# Patient Record
Sex: FEMALE | Race: BLACK OR AFRICAN AMERICAN | Employment: OTHER | URBAN - METROPOLITAN AREA
[De-identification: names, ages, dates, MRNs, and addresses within clinical notes are randomized per-mention and may not be internally consistent; named-entity substitution may affect disease eponyms.]

---

## 2017-01-04 ENCOUNTER — OFFICE VISIT (OUTPATIENT)
Dept: ONCOLOGY | Age: 72
End: 2017-01-04

## 2017-01-04 VITALS
HEART RATE: 84 BPM | OXYGEN SATURATION: 98 % | BODY MASS INDEX: 31.76 KG/M2 | WEIGHT: 186 LBS | RESPIRATION RATE: 18 BRPM | DIASTOLIC BLOOD PRESSURE: 72 MMHG | HEIGHT: 64 IN | TEMPERATURE: 98 F | SYSTOLIC BLOOD PRESSURE: 138 MMHG

## 2017-01-04 DIAGNOSIS — D75.839 THROMBOCYTOSIS: Primary | ICD-10-CM

## 2017-01-04 NOTE — PROGRESS NOTES
Mere Bolden is a 66 y.o. female here for follow up had concerns including Follow-up. ,mild fatigue, pain #8/10     Ms. Renay Pearson has a reminder for a \"due or due soon\" health maintenance. I have asked that she contact her primary care provider for follow-up on this health maintenance.

## 2017-07-10 ENCOUNTER — OFFICE VISIT (OUTPATIENT)
Dept: ONCOLOGY | Age: 72
End: 2017-07-10

## 2017-07-10 ENCOUNTER — HOSPITAL ENCOUNTER (OUTPATIENT)
Dept: LAB | Age: 72
Discharge: HOME | End: 2017-07-10
Payer: MEDICARE

## 2017-07-10 VITALS
WEIGHT: 183 LBS | SYSTOLIC BLOOD PRESSURE: 124 MMHG | BODY MASS INDEX: 31.24 KG/M2 | RESPIRATION RATE: 18 BRPM | OXYGEN SATURATION: 99 % | HEART RATE: 80 BPM | TEMPERATURE: 98.3 F | HEIGHT: 64 IN | DIASTOLIC BLOOD PRESSURE: 76 MMHG

## 2017-07-10 DIAGNOSIS — D75.839 THROMBOCYTOSIS: Primary | ICD-10-CM

## 2017-07-10 PROCEDURE — 36415 COLL VENOUS BLD VENIPUNCTURE: CPT

## 2017-07-10 PROCEDURE — 85025 COMPLETE CBC W/AUTO DIFF WBC: CPT

## 2017-07-10 NOTE — PROGRESS NOTES
Follow up Note        Patient: Radha Benitez MRN: [id number]  SSN: [id number]    YOB: 1945  Age: 66 y.o. Sex: female      Subjective:      Radha Benitez is a 66 y.o. female with mild thrombocytosis. She also has mild anemia. She is asymptomatic. Denies bleeding or infection. She is fatigued, but otherwise has no new complaints. She continues to work as a caregiver. She does not have any recent labs from her PCP. Review of Systems:    Constitutional: fatigue  Eyes: negative  Ears, Nose, Mouth, Throat, and Face: negative  Respiratory: negative  Cardiovascular: negative  Gastrointestinal: negative  Genitourinary:negative  Integument/Breast: negative  Hematologic/Lymphatic: negative  Musculoskeletal:negative  Neurological: negative      Past Medical History:   Diagnosis Date    Asthma     Diabetes ([address]     Hypertension      Past Surgical History:   Procedure Laterality Date    HX GYN      Partial Hysterectomy      Family History   Problem Relation Age of Onset    Cancer Mother     Diabetes Mother     Cancer Sister     Diabetes Sister     Psychiatric Disorder Sister     Cancer Brother      Social History   Substance Use Topics    Smoking status: Former Smoker    Smokeless tobacco: Not on file    Alcohol use Yes      Comment: occasional      Prior to Admission medications    Medication Sig Start Date End Date Taking? Authorizing Provider   [facility] 3.75 gram pwpk powder packet Take 3.75 g by mouth daily. 1/10/16  Yes Historical Provider   hydrochlorothiazide (HYDRODIURIL) 25 mg tablet Take 25 mg by mouth daily. 2/9/16  Yes Historical Provider   metFORMIN (GLUCOPHAGE) 500 mg tablet Take 500 mg by mouth daily (with breakfast). 12/4/15  Yes Historical Provider   NIFEDICAL XL 60 mg ER tablet Take 60 mg by mouth daily. 2/10/16  Yes Historical Provider   potassium chloride SA (MICRO-K) 10 mEq capsule Take 10 mEq by mouth two (2) times a day.  1/21/16  Yes Historical Provider   terbutaline (BRETHINE) 2.5 mg tablet Take 2.5 mg by mouth three (3) times daily. 2/11/16  Yes Historical Provider   zafirlukast (ACCOLATE) 20 mg tablet Take 20 mg by mouth two (2) times a day. 2/15/16  Yes Historical Provider   lansoprazole (PREVACID) 30 mg capsule Take  by mouth Daily (before breakfast). Yes Historical Provider   fluticasone (VERAMYST) 27.5 mcg/actuation nasal spray 2 Sprays by Nasal route daily. Yes Historical Provider   PROAIR HFA 90 mcg/actuation inhaler  2/1/16   Historical Provider   predniSONE (DELTASONE) 5 mg tablet  2/16/16   Historical Provider   tobramycin (TOBREX) 0.3 % ophthalmic solution Administer 1 Drop to both eyes. 2/1/16   Historical Provider            Allergies   Allergen Reactions    Aspirin Nausea Only    Codeine Other (comments)     Jittery    Lodine [Etodolac] Other (comments)     Nausea, jittery           Objective:     Vitals:    07/10/17 1021   BP: 124/76   Pulse: 80   Resp: 18   Temp: 98.3 °F (36.8 °C)   TempSrc: Oral   SpO2: 99%   Weight: 183 lb (83 kg)   Height: 5' 4\" (1.626 m)          Physical Exam:    GENERAL: alert, cooperative  EYE: negative  LYMPHATIC: Cervical, supraclavicular, and axillary nodes normal.   THROAT & NECK: normal and no erythema or exudates noted. LUNG: clear to auscultation bilaterally  HEART: regular rate and rhythm  ABDOMEN: soft, non-tender  EXTREMITIES: no cyanosis or edema  SKIN: Normal.  NEUROLOGIC: negative        LABS:     Lab Results   Component Value Date/Time    WBC 6.5 07/22/2016 09:16 AM    HGB 11.7 07/22/2016 09:16 AM    HCT 36.8 07/22/2016 09:16 AM    PLATELET 787 00/57/2478 09:16 AM    MCV 78 07/22/2016 09:16 AM             Assessment:     1. Mild thrombocytosis    ? Essential thrombocytosis  If the platelet continue to rise, I will obtain JESSICA 2 mutation        Plan:       1. Repeat CBC today and continue observation  2.  Follow up in 6 months        Signed by: Usman Pfeiffer MD                     July 10, 2017

## 2017-07-10 NOTE — PROGRESS NOTES
Chief Complaint   Patient presents with    Follow-up     increased platelets; just had labs done this am     Appetite normal. Urrinating/bowels normal.

## 2017-07-11 ENCOUNTER — DOCUMENTATION ONLY (OUTPATIENT)
Dept: ONCOLOGY | Age: 72
End: 2017-07-11

## 2017-07-11 LAB
BASOPHILS # BLD AUTO: 0 X10E3/UL (ref 0–0.2)
BASOPHILS NFR BLD AUTO: 0 %
EOSINOPHIL # BLD AUTO: 0.5 X10E3/UL (ref 0–0.4)
EOSINOPHIL NFR BLD AUTO: 6 %
ERYTHROCYTE [DISTWIDTH] IN BLOOD BY AUTOMATED COUNT: 15.6 % (ref 12.3–15.4)
HCT VFR BLD AUTO: 33.9 % (ref 34–46.6)
HGB BLD-MCNC: 11.1 G/DL (ref 11.1–15.9)
IMM GRANULOCYTES # BLD: 0 X10E3/UL (ref 0–0.1)
IMM GRANULOCYTES NFR BLD: 0 %
LYMPHOCYTES # BLD AUTO: 2.4 X10E3/UL (ref 0.7–3.1)
LYMPHOCYTES NFR BLD AUTO: 34 %
MCH RBC QN AUTO: 25.6 PG (ref 26.6–33)
MCHC RBC AUTO-ENTMCNC: 32.7 G/DL (ref 31.5–35.7)
MCV RBC AUTO: 78 FL (ref 79–97)
MONOCYTES # BLD AUTO: 0.3 X10E3/UL (ref 0.1–0.9)
MONOCYTES NFR BLD AUTO: 4 %
NEUTROPHILS # BLD AUTO: 4 X10E3/UL (ref 1.4–7)
NEUTROPHILS NFR BLD AUTO: 56 %
PLATELET # BLD AUTO: 437 X10E3/UL (ref 150–379)
RBC # BLD AUTO: 4.34 X10E6/UL (ref 3.77–5.28)
WBC # BLD AUTO: 7.2 X10E3/UL (ref 3.4–10.8)

## 2017-07-11 NOTE — PROGRESS NOTES
Patient made aware of lab results, and platelets are stable. HIPAA verified by two patient identifiers.

## 2017-09-22 ENCOUNTER — HOSPITAL ENCOUNTER (OUTPATIENT)
Dept: GENERAL RADIOLOGY | Age: 72
Discharge: HOME | End: 2017-09-22
Payer: MEDICARE

## 2017-09-22 ENCOUNTER — HOSPITAL ENCOUNTER (OUTPATIENT)
Dept: MAMMOGRAPHY | Age: 72
Discharge: HOME | End: 2017-09-22
Payer: MEDICARE

## 2017-09-22 DIAGNOSIS — Z12.31 VISIT FOR SCREENING MAMMOGRAM: ICD-10-CM

## 2017-09-22 DIAGNOSIS — M79.641 RIGHT HAND PAIN: ICD-10-CM

## 2017-09-22 PROCEDURE — G0202 SCR MAMMO BI INCL CAD: HCPCS

## 2017-09-22 PROCEDURE — 73130 X-RAY EXAM OF HAND: CPT

## 2018-01-10 ENCOUNTER — OFFICE VISIT (OUTPATIENT)
Dept: ONCOLOGY | Age: 73
End: 2018-01-10

## 2018-01-10 ENCOUNTER — HOSPITAL ENCOUNTER (OUTPATIENT)
Dept: LAB | Age: 73
Discharge: HOME | End: 2018-01-10
Payer: MEDICARE

## 2018-01-10 VITALS
WEIGHT: 185.6 LBS | RESPIRATION RATE: 16 BRPM | SYSTOLIC BLOOD PRESSURE: 115 MMHG | BODY MASS INDEX: 31.69 KG/M2 | HEIGHT: 64 IN | OXYGEN SATURATION: 96 % | TEMPERATURE: 98.1 F | HEART RATE: 80 BPM | DIASTOLIC BLOOD PRESSURE: 74 MMHG

## 2018-01-10 DIAGNOSIS — D75.839 THROMBOCYTOSIS: ICD-10-CM

## 2018-01-10 DIAGNOSIS — D75.839 THROMBOCYTOSIS: Primary | ICD-10-CM

## 2018-01-10 PROCEDURE — 82728 ASSAY OF FERRITIN: CPT

## 2018-01-10 PROCEDURE — 81270 JAK2 GENE: CPT

## 2018-01-10 PROCEDURE — 36415 COLL VENOUS BLD VENIPUNCTURE: CPT

## 2018-01-10 PROCEDURE — 81402 MOPATH PROCEDURE LEVEL 3: CPT

## 2018-01-10 PROCEDURE — 81206 BCR/ABL1 GENE MAJOR BP: CPT

## 2018-01-10 PROCEDURE — 85025 COMPLETE CBC W/AUTO DIFF WBC: CPT

## 2018-01-10 RX ORDER — METHYLPREDNISOLONE 4 MG/1
TABLET ORAL
Refills: 0 | COMMUNITY
Start: 2017-12-10 | End: 2018-01-10 | Stop reason: ALTCHOICE

## 2018-01-10 RX ORDER — NEOMYCIN SULFATE, POLYMYXIN B SULFATE AND HYDROCORTISONE 10; 3.5; 1 MG/ML; MG/ML; [USP'U]/ML
SUSPENSION/ DROPS AURICULAR (OTIC)
Refills: 0 | COMMUNITY
Start: 2017-10-24 | End: 2019-07-17 | Stop reason: ALTCHOICE

## 2018-01-10 RX ORDER — LIDOCAINE 50 MG/G
PATCH TOPICAL
Refills: 0 | COMMUNITY
Start: 2017-10-26

## 2018-01-10 RX ORDER — MECLIZINE HYDROCHLORIDE 25 MG/1
TABLET ORAL
Refills: 0 | COMMUNITY
Start: 2017-10-24

## 2018-01-10 NOTE — PROGRESS NOTES
Follow up Note        Patient: Geneva iGfford MRN: [id number]  SSN: [id number]    YOB: 1945  Age: 74 y.o. Sex: female      Subjective:      Geneva Gifford is a 74 y.o. female with mild thrombocytosis. She also has mild anemia. She is asymptomatic. Denies bleeding or infection. She is fatigued, but otherwise has no new complaints. She continues to work as a caregiver. Review of Systems:    Constitutional: fatigue  Eyes: negative  Ears, Nose, Mouth, Throat, and Face: negative  Respiratory: negative  Cardiovascular: negative  Gastrointestinal: negative  Genitourinary:negative  Integument/Breast: negative  Hematologic/Lymphatic: negative  Musculoskeletal:negative  Neurological: negative      Past Medical History:   Diagnosis Date    Asthma     [address] [address] [facility]     Hypertension     Menopause 1978     Past Surgical History:   Procedure Laterality Date    HX GYN      Partial Hysterectomy    HX HYSTERECTOMY  1978      Family History   Problem Relation Age of Onset    Cancer Mother     Diabetes Mother     Cancer Sister     Diabetes Sister     Psychiatric Disorder Sister     Cancer Brother     Breast Cancer Maternal Aunt      Social History   Substance Use Topics    Smoking status: Former Smoker    Smokeless tobacco: Never Used    Alcohol use Yes      Comment: occasional      Prior to Admission medications    Medication Sig Start Date End Date Taking? Authorizing Provider   neomycin-polymyxin-hydrocortisone, buffered, (PEDIOTIC) 3.5-10,000-1 mg/mL-unit/mL-% otic suspension instill 4 drops into affected ear three times a day for 10 days 10/24/17  Yes Historical Provider   PROAIR HFA 90 mcg/actuation inhaler  2/1/16  Yes Historical Provider   WELCHOL 3.75 gram pwpk powder packet Take 3.75 g by mouth daily. 1/10/16  Yes Historical Provider   hydrochlorothiazide (HYDRODIURIL) 25 mg tablet Take 25 mg by mouth daily.  2/9/16  Yes Historical Provider   metFORMIN (GLUCOPHAGE) 500 mg tablet Take 500 mg by mouth daily (with breakfast). 12/4/15  Yes Historical Provider   NIFEDICAL XL 60 mg ER tablet Take 60 mg by mouth daily. 2/10/16  Yes Historical Provider   potassium chloride SA (MICRO-K) 10 mEq capsule Take 10 mEq by mouth two (2) times a day. 1/21/16  Yes Historical Provider   terbutaline (BRETHINE) 2.5 mg tablet Take 2.5 mg by mouth three (3) times daily. 2/11/16  Yes Historical Provider   zafirlukast (ACCOLATE) 20 mg tablet Take 20 mg by mouth two (2) times a day. 2/15/16  Yes Historical Provider   lansoprazole (PREVACID) 30 mg capsule Take  by mouth Daily (before breakfast). Yes Historical Provider   fluticasone (VERAMYST) 27.5 mcg/actuation nasal spray 2 Sprays by Nasal route daily. Yes Historical Provider   lidocaine (LIDODERM) 5 % apply patch to affected area once daily if needed for pain -ON FOR 12 HOURS OFF FOR 12 HOURS 10/26/17   Historical Provider   meclizine (ANTIVERT) 25 mg tablet take 1 tablet by mouth twice a day if needed 10/24/17   Historical Provider   predniSONE (DELTASONE) 5 mg tablet  2/16/16   Historical Provider   tobramycin (TOBREX) 0.3 % ophthalmic solution Administer 1 Drop to both eyes. 2/1/16   Historical Provider            Allergies   Allergen Reactions    Aspirin Nausea Only    Codeine Other (comments)     Jittery    Lodine [Etodolac] Other (comments)     Nausea, jittery           Objective:     Vitals:    01/10/18 1039   BP: 115/74   Pulse: 80   Resp: 16   Temp: 98.1 °F (36.7 °C)   TempSrc: Oral   SpO2: 96%   Weight: 185 lb 9.6 oz (84.2 kg)   Height: 5' 4\" (1.626 m)          Physical Exam:    GENERAL: alert, cooperative  EYE: negative  LYMPHATIC: Cervical, supraclavicular, and axillary nodes normal.   THROAT & NECK: normal and no erythema or exudates noted.    LUNG: clear to auscultation bilaterally  HEART: regular rate and rhythm  ABDOMEN: soft, non-tender  EXTREMITIES: no cyanosis or edema  SKIN: Normal.  NEUROLOGIC: negative        LABS:     Lab Results Component Value Date/Time    WBC 7.2 07/10/2017 11:38 AM    HGB 11.1 07/10/2017 11:38 AM    HCT 33.9 07/10/2017 11:38 AM    PLATELET 612 82/97/4986 11:38 AM    MCV 78 07/10/2017 11:38 AM             Assessment:     1. Mild thrombocytosis    ? Essential thrombocytosis  Platelets stable    Recheck today    Symptom management form reviewed with patient.       Plan:       > Labs today: CBC, JAK2, MPL,BCR-ABL1, Ferritin  > Follow-up in 6 months      Signed by: Ngozi Loya MD                     January 10, 2018

## 2018-01-10 NOTE — PROGRESS NOTES
Cris Sunshine is a 74 y.o. female here today for Thrombocytosis f/u. Observation. VS stable. Patient states she has pain 4/10 to lower back d/t spinal stenosis and pain to bilat knees d/t arthritis. Patient denies active bleeding. Blood pressure 115/74, pulse 80, temperature 98.1 °F (36.7 °C), temperature source Oral, resp. rate 16, height 5' 4\" (1.626 m), weight 185 lb 9.6 oz (84.2 kg), SpO2 96 %.

## 2018-01-17 LAB
BACKGROUND, 081113: NORMAL
BACKGROUND: 480503: NORMAL
BASOPHILS # BLD AUTO: 0 X10E3/UL (ref 0–0.2)
BASOPHILS NFR BLD AUTO: 1 %
EOSINOPHIL # BLD AUTO: 0.3 X10E3/UL (ref 0–0.4)
EOSINOPHIL NFR BLD AUTO: 4 %
ERYTHROCYTE [DISTWIDTH] IN BLOOD BY AUTOMATED COUNT: 15.8 % (ref 12.3–15.4)
FERRITIN SERPL-MCNC: 107 NG/ML (ref 15–150)
HCT VFR BLD AUTO: 35.8 % (ref 34–46.6)
HGB BLD-MCNC: 12.1 G/DL (ref 11.1–15.9)
IMM GRANULOCYTES # BLD: 0 X10E3/UL (ref 0–0.1)
IMM GRANULOCYTES NFR BLD: 0 %
INTERPRETATION: 480488: NORMAL
JAK2 P.V617F BLD/T QL: NORMAL
LAB DIRECTOR NAME PROVIDER: NORMAL
LYMPHOCYTES # BLD AUTO: 2.4 X10E3/UL (ref 0.7–3.1)
LYMPHOCYTES NFR BLD AUTO: 34 %
MCH RBC QN AUTO: 25.7 PG (ref 26.6–33)
MCHC RBC AUTO-ENTMCNC: 33.8 G/DL (ref 31.5–35.7)
MCV RBC AUTO: 76 FL (ref 79–97)
MONOCYTES # BLD AUTO: 0.4 X10E3/UL (ref 0.1–0.9)
MONOCYTES NFR BLD AUTO: 6 %
MPL GENE MUT TESTED BLD/T: NORMAL
MPL P.W515L+W515K+S505N BLD/T QL: NORMAL
NEUTROPHILS # BLD AUTO: 4 X10E3/UL (ref 1.4–7)
NEUTROPHILS NFR BLD AUTO: 55 %
PLATELET # BLD AUTO: 522 X10E3/UL (ref 150–379)
RBC # BLD AUTO: 4.71 X10E6/UL (ref 3.77–5.28)
REF LAB TEST METHOD: NORMAL
REFERENCES, 150293: NORMAL
SERVICE CMNT-IMP: NORMAL
T(ABL1,BCR)B2A2/CONTROL BLD/T: NORMAL %
T(ABL1,BCR)B3A2/CONTROL BLD/T: NORMAL %
T(ABL1,BCR)E1A2/CONTROL BLD/T: NORMAL %
WBC # BLD AUTO: 7 X10E3/UL (ref 3.4–10.8)

## 2018-07-23 ENCOUNTER — OFFICE VISIT (OUTPATIENT)
Dept: ONCOLOGY | Age: 73
End: 2018-07-23

## 2018-07-23 ENCOUNTER — HOSPITAL ENCOUNTER (OUTPATIENT)
Dept: LAB | Age: 73
Discharge: HOME | End: 2018-07-23
Payer: MEDICARE

## 2018-07-23 VITALS
RESPIRATION RATE: 16 BRPM | BODY MASS INDEX: 32.71 KG/M2 | HEIGHT: 64 IN | SYSTOLIC BLOOD PRESSURE: 126 MMHG | TEMPERATURE: 98.5 F | WEIGHT: 191.6 LBS | HEART RATE: 75 BPM | DIASTOLIC BLOOD PRESSURE: 75 MMHG | OXYGEN SATURATION: 97 %

## 2018-07-23 DIAGNOSIS — D75.839 THROMBOCYTOSIS: Primary | ICD-10-CM

## 2018-07-23 PROCEDURE — 36415 COLL VENOUS BLD VENIPUNCTURE: CPT

## 2018-07-23 PROCEDURE — 85027 COMPLETE CBC AUTOMATED: CPT

## 2018-07-23 NOTE — PROGRESS NOTES
Nupur Sweeney is a 74 y.o. female here today for Thrombocytosis f/u. 1/10/18 labs Ferritin 107, JAK2; negative, No MPL mutation. WBC, 7.0, Hgb 12.1, HCT 35.8, Plts 522. VS stable. Patient states she has back d/t spinal stenosis and b/l knee pain; pain 7/10; no relief with Ibuprofen. Good appetite. Patient states she has numbness and tingling in her fingers at times. Patient denies active bleeding. 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 \"    Visit Vitals    /75 (BP 1 Location: Left arm, BP Patient Position: Sitting)    Pulse 75    Temp 98.5 °F (36.9 °C) (Oral)    Resp 16    Ht 5' 4\" (1.626 m)    Wt 191 lb 9.6 oz (86.9 kg)    SpO2 97%    BMI 32.89 kg/m2     Health Maintenance Review: Patient reminded of \"due or due soon\" health maintenance. I have asked the patient to contact his/her primary care provider (PCP) for follow-up on his/her health maintenance.

## 2018-07-23 NOTE — PROGRESS NOTES
Follow up Note        Patient: Jose L Arriaga MRN: [id number]  SSN: [id number]    YOB: 1945  Age: 74 y.o. Sex: female      Subjective:      Jose L Arriaga is a 74 y.o. female with thrombocytosis. She is asymptomatic. Denies bleeding or infection. Review of Systems:    Constitutional: fatigue  Eyes: negative  Ears, Nose, Mouth, Throat, and Face: negative  Respiratory: negative  Cardiovascular: negative  Gastrointestinal: negative  Genitourinary:negative  Integument/Breast: negative  Hematologic/Lymphatic: negative  Musculoskeletal:back and knee pain  Neurological: negative      Past Medical History:   Diagnosis Date    Asthma     [address] [address]     Hypertension     Menopause 1978     Past Surgical History:   Procedure Laterality Date    HX GYN      Partial Hysterectomy    HX HYSTERECTOMY  1978      Family History   Problem Relation Age of Onset   [address] [place] [address] Cancer Mother     Diabetes Mother     Cancer Sister     Diabetes Sister     Psychiatric Disorder Sister     Cancer Brother     Breast Cancer Maternal Aunt      Social History   Substance Use Topics    Smoking status: Former Smoker    Smokeless tobacco: Never Used    Alcohol use Yes      Comment: occasional      Prior to Admission medications    Medication Sig Start Date End Date Taking? Authorizing Provider   lidocaine (LIDODERM) 5 % apply patch to affected area once daily if needed for pain -ON FOR 12 HOURS OFF FOR 12 HOURS 10/26/17  Yes Historical Provider   meclizine (ANTIVERT) 25 mg tablet take 1 tablet by mouth twice a day if needed 10/24/17  Yes Historical Provider   neomycin-polymyxin-hydrocortisone, buffered, (PEDIOTIC) 3.5-10,000-1 mg/mL-unit/mL-% otic suspension instill 4 drops into affected ear three times a day for 10 days 10/24/17  Yes Historical Provider   PROAIR HFA 90 mcg/actuation inhaler  2/1/16  Yes Historical Provider   WELCHOL 3.75 gram pwpk powder packet Take 3.75 g by mouth daily.  1/10/16  Yes Historical Provider   hydrochlorothiazide (HYDRODIURIL) 25 mg tablet Take 25 mg by mouth daily. 2/9/16  Yes Historical Provider   metFORMIN (GLUCOPHAGE) 500 mg tablet Take 500 mg by mouth daily (with breakfast). 12/4/15  Yes Historical Provider   NIFEDICAL XL 60 mg ER tablet Take 60 mg by mouth daily. 2/10/16  Yes Historical Provider   potassium chloride SA (MICRO-K) 10 mEq capsule Take 10 mEq by mouth two (2) times a day. 1/21/16  Yes Historical Provider   terbutaline (BRETHINE) 2.5 mg tablet Take 2.5 mg by mouth three (3) times daily. 2/11/16  Yes Historical Provider   zafirlukast (ACCOLATE) 20 mg tablet Take 20 mg by mouth two (2) times a day. 2/15/16  Yes Historical Provider   lansoprazole (PREVACID) 30 mg capsule Take  by mouth Daily (before breakfast). Yes Historical Provider   predniSONE (DELTASONE) 5 mg tablet  2/16/16   Historical Provider   tobramycin (TOBREX) 0.3 % ophthalmic solution Administer 1 Drop to both eyes. 2/1/16   Historical Provider   fluticasone (VERAMYST) 27.5 mcg/actuation nasal spray 2 Sprays by Nasal route daily. Historical Provider            Allergies   Allergen Reactions    Aspirin Nausea Only    Codeine Other (comments)     Jittery    Lodine [Etodolac] Other (comments)     Nausea, jittery           Objective:     Vitals:    07/23/18 1035   BP: 126/75   Pulse: 75   Resp: 16   Temp: 98.5 °F (36.9 °C)   TempSrc: Oral   SpO2: 97%   Weight: 191 lb 9.6 oz (86.9 kg)   Height: 5' 4\" (1.626 m)          Physical Exam:    GENERAL: alert, cooperative  EYE: negative  LYMPHATIC: Cervical, supraclavicular, and axillary nodes normal.   THROAT & NECK: normal and no erythema or exudates noted.    LUNG: clear to auscultation bilaterally  HEART: regular rate and rhythm  ABDOMEN: soft, non-tender  EXTREMITIES: no cyanosis or edema  SKIN: Normal.  NEUROLOGIC: negative        LABS: 1/10/2018    Lab Results   Component Value Date/Time    WBC 7.0 01/10/2018 11:34 AM    HGB 12.1 01/10/2018 11:34 AM    HCT 35.8 01/10/2018 11:34 AM    PLATELET 236 (H) 31/12/8180 11:34 AM    MCV 76 (L) 01/10/2018 11:34 AM         Assessment:     1. Mild thrombocytosis    ? Essential thrombocytosis  Platelets stable    JAK2, BCR-ABL1, MPL mutation - negative  Asymptomatic  Symptom management form reviewed with patient.       Plan:       > continue observation   > CBC without diff today  > Follow-up in 6 months        Signed by: Louise Gauthier MD                     July 24, 2018

## 2018-07-24 LAB
ERYTHROCYTE [DISTWIDTH] IN BLOOD BY AUTOMATED COUNT: 15.3 % (ref 12.3–15.4)
HCT VFR BLD AUTO: 32.4 % (ref 34–46.6)
HGB BLD-MCNC: 10.9 G/DL (ref 11.1–15.9)
MCH RBC QN AUTO: 26.1 PG (ref 26.6–33)
MCHC RBC AUTO-ENTMCNC: 33.6 G/DL (ref 31.5–35.7)
MCV RBC AUTO: 78 FL (ref 79–97)
PLATELET # BLD AUTO: 498 X10E3/UL (ref 150–379)
RBC # BLD AUTO: 4.17 X10E6/UL (ref 3.77–5.28)
WBC # BLD AUTO: 9.6 X10E3/UL (ref 3.4–10.8)

## 2019-01-25 ENCOUNTER — HOSPITAL ENCOUNTER (OUTPATIENT)
Dept: LAB | Age: 74
Discharge: HOME | End: 2019-01-25
Payer: MEDICARE

## 2019-01-25 ENCOUNTER — OFFICE VISIT (OUTPATIENT)
Dept: ONCOLOGY | Age: 74
End: 2019-01-25

## 2019-01-25 VITALS
SYSTOLIC BLOOD PRESSURE: 137 MMHG | RESPIRATION RATE: 18 BRPM | WEIGHT: 186 LBS | OXYGEN SATURATION: 97 % | DIASTOLIC BLOOD PRESSURE: 80 MMHG | BODY MASS INDEX: 31.76 KG/M2 | HEIGHT: 64 IN | HEART RATE: 80 BPM | TEMPERATURE: 98.5 F

## 2019-01-25 DIAGNOSIS — D75.839 THROMBOCYTOSIS: Primary | ICD-10-CM

## 2019-01-25 PROCEDURE — 36415 COLL VENOUS BLD VENIPUNCTURE: CPT

## 2019-01-25 PROCEDURE — 85025 COMPLETE CBC W/AUTO DIFF WBC: CPT

## 2019-01-25 NOTE — PROGRESS NOTES
[facility] [place] 
at [facility], [facility], [address] 
[phone] Follow up Note Patient: Radha Benitez MRN: [id number]  SSN: [id number] YOB: 1945  Age: 74 y.o. Sex: female Subjective:  
  
Radha Benitez is a 74 y.o. female with thrombocytosis. She is asymptomatic. Denies bleeding or infection. Review of Systems: 
 
Constitutional: fatigue Eyes: negative Ears, Nose, Mouth, Throat, and Face: negative Respiratory: negative Cardiovascular: negative Gastrointestinal: negative Genitourinary:negative Integument/Breast: negative Hematologic/Lymphatic: negative Musculoskeletal:back and knee pain Neurological: negative Past Medical History:  
Diagnosis Date  Asthma  Diabetes ([address]  Hypertension [address] [facility] Past Surgical History:  
Procedure Laterality Date  [facility] Family History Problem Relation Age of Onset  Cancer Mother  Diabetes Mother  Cancer Sister  Diabetes Sister  Psychiatric Disorder Sister  Cancer Brother  Breast Cancer Maternal Aunt Social History Tobacco Use  Smoking status: Former Smoker  Smokeless tobacco: Never Used Substance Use Topics  Alcohol use: Yes Comment: occasional  
  
Prior to Admission medications Medication Sig Start Date End Date Taking? Authorizing Provider PROAIR HFA 90 mcg/actuation inhaler  2/1/16  Yes Provider, Historical  
hydrochlorothiazide (HYDRODIURIL) 25 mg tablet Take 25 mg by mouth daily. 2/9/16  Yes Provider, Historical  
metFORMIN (GLUCOPHAGE) 500 mg tablet Take 500 mg by mouth daily (with breakfast). 12/4/15  Yes Provider, Historical  
NIFEDICAL XL 60 mg ER tablet Take 60 mg by mouth daily.  2/10/16  Yes Provider, Historical  
 potassium chloride SA (MICRO-K) 10 mEq capsule Take 10 mEq by mouth two (2) times a day. 1/21/16  Yes Provider, Historical  
terbutaline (BRETHINE) 2.5 mg tablet Take 2.5 mg by mouth three (3) times daily. 2/11/16  Yes Provider, Historical  
zafirlukast (ACCOLATE) 20 mg tablet Take 20 mg by mouth two (2) times a day. 2/15/16  Yes Provider, Historical  
lansoprazole (PREVACID) 30 mg capsule Take  by mouth Daily (before breakfast). Yes Provider, Historical  
fluticasone (VERAMYST) 27.5 mcg/actuation nasal spray 2 Sprays by Nasal route daily. Yes Provider, Historical  
lidocaine (LIDODERM) 5 % apply patch to affected area once daily if needed for pain -ON FOR 12 HOURS OFF FOR 12 HOURS 10/26/17   Provider, Historical  
meclizine (ANTIVERT) 25 mg tablet take 1 tablet by mouth twice a day if needed 10/24/17   Provider, Historical  
neomycin-polymyxin-hydrocortisone, buffered, (PEDIOTIC) 3.5-10,000-1 mg/mL-unit/mL-% otic suspension instill 4 drops into affected ear three times a day for 10 days 10/24/17   Provider, Historical  
WELCHOL 3.75 gram pwpk powder packet Take 3.75 g by mouth daily. 1/10/16   Provider, Historical  
predniSONE (DELTASONE) 5 mg tablet  2/16/16   Provider, Historical  
tobramycin (TOBREX) 0.3 % ophthalmic solution Administer 1 Drop to both eyes. 2/1/16   Provider, Historical  
  
 
 
 
Allergies Allergen Reactions  Aspirin Nausea Only  Codeine Other (comments) Jittery  Lodine [Etodolac] Other (comments) Nausea, jittery Objective:  
 
Vitals:  
 01/25/19 1042 BP: 137/80 Pulse: 80 Resp: 18 Temp: 98.5 °F (36.9 °C) TempSrc: Oral  
SpO2: 97% Weight: 186 lb (84.4 kg) Height: 5' 4\" (1.626 m) Physical Exam: 
 
GENERAL: alert, cooperative EYE: negative LYMPHATIC: Cervical, supraclavicular, and axillary nodes normal.  
THROAT & NECK: normal and no erythema or exudates noted. LUNG: clear to auscultation bilaterally HEART: regular rate and rhythm ABDOMEN: soft, non-tender EXTREMITIES: no cyanosis or edema SKIN: Normal. 
NEUROLOGIC: negative LABS: 1/10/2018 Lab Results Component Value Date/Time WBC 6.6 01/25/2019 12:00 PM  
 HGB 11.3 01/25/2019 12:00 PM  
 HCT 35.9 01/25/2019 12:00 PM  
 PLATELET 978 (H) 27/02/0956 12:00 PM  
 MCV 79 01/25/2019 12:00 PM  
 
 
 
Assessment:  
 
1. Mild thrombocytosis ? Essential thrombocytosis Platelets stable JAK2, BCR-ABL1, MPL mutation - negative Asymptomatic Symptom management form reviewed with patient. Plan:  
 
 
> continue observation  
> CBC without diff today 
> Follow-up in 6 months Signed by: Luci Gomez MD 
                   January 27, 2019

## 2019-01-25 NOTE — PROGRESS NOTES
Bautista Gonzalez is a 74 y.o. female here today for Thrombocytosis f/u. 1/10/18 labs Ferritin 107, JAK2; negative, NVS stable. Patient reports pain d/t spinal stenosis. Good appetite. 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 Visit Vitals /80 (BP 1 Location: Left arm, BP Patient Position: Sitting) Pulse 80 Temp 98.5 °F (36.9 °C) (Oral) Resp 18 Ht 5' 4\" (1.626 m) Wt 186 lb (84.4 kg) SpO2 97% BMI 31.93 kg/m² 1. Have you been to the ER, urgent care clinic since your last visit? Hospitalized since your last visit? yes; back pain 2. Have you seen or consulted any other health care providers outside of the [address] [place] [address] since your last visit? Include any pap smears or colon screening. No  
 
Health Maintenance Review: Patient reminded of \"due or due soon\" health maintenance. I have asked the patient to contact his/her primary care provider (PCP) for follow-up on his/her health maintenance.

## 2019-01-26 LAB
BASOPHILS # BLD AUTO: 0.1 X10E3/UL (ref 0–0.2)
BASOPHILS NFR BLD AUTO: 1 %
EOSINOPHIL # BLD AUTO: 0.2 X10E3/UL (ref 0–0.4)
EOSINOPHIL NFR BLD AUTO: 3 %
ERYTHROCYTE [DISTWIDTH] IN BLOOD BY AUTOMATED COUNT: 16 % (ref 12.3–15.4)
HCT VFR BLD AUTO: 35.9 % (ref 34–46.6)
HGB BLD-MCNC: 11.3 G/DL (ref 11.1–15.9)
IMM GRANULOCYTES # BLD AUTO: 0 X10E3/UL (ref 0–0.1)
IMM GRANULOCYTES NFR BLD AUTO: 0 %
LYMPHOCYTES # BLD AUTO: 2.4 X10E3/UL (ref 0.7–3.1)
LYMPHOCYTES NFR BLD AUTO: 37 %
MCH RBC QN AUTO: 24.9 PG (ref 26.6–33)
MCHC RBC AUTO-ENTMCNC: 31.5 G/DL (ref 31.5–35.7)
MCV RBC AUTO: 79 FL (ref 79–97)
MONOCYTES # BLD AUTO: 0.3 X10E3/UL (ref 0.1–0.9)
MONOCYTES NFR BLD AUTO: 4 %
NEUTROPHILS # BLD AUTO: 3.6 X10E3/UL (ref 1.4–7)
NEUTROPHILS NFR BLD AUTO: 55 %
PLATELET # BLD AUTO: 485 X10E3/UL (ref 150–379)
RBC # BLD AUTO: 4.54 X10E6/UL (ref 3.77–5.28)
WBC # BLD AUTO: 6.6 X10E3/UL (ref 3.4–10.8)

## 2019-07-17 ENCOUNTER — OFFICE VISIT (OUTPATIENT)
Dept: ONCOLOGY | Age: 74
End: 2019-07-17

## 2019-07-17 VITALS
HEIGHT: 64 IN | TEMPERATURE: 98.1 F | RESPIRATION RATE: 18 BRPM | WEIGHT: 184 LBS | DIASTOLIC BLOOD PRESSURE: 74 MMHG | BODY MASS INDEX: 31.41 KG/M2 | HEART RATE: 71 BPM | SYSTOLIC BLOOD PRESSURE: 114 MMHG | OXYGEN SATURATION: 95 %

## 2019-07-17 DIAGNOSIS — D50.9 IRON DEFICIENCY ANEMIA, UNSPECIFIED IRON DEFICIENCY ANEMIA TYPE: ICD-10-CM

## 2019-07-17 DIAGNOSIS — D75.839 THROMBOCYTOSIS: Primary | ICD-10-CM

## 2019-07-17 RX ORDER — PREDNISONE 10 MG/1
TABLET ORAL
Refills: 0 | COMMUNITY
Start: 2019-04-09 | End: 2019-07-17 | Stop reason: ALTCHOICE

## 2019-07-17 RX ORDER — DICLOFENAC SODIUM 10 MG/G
GEL TOPICAL
Refills: 0 | COMMUNITY
Start: 2019-04-30

## 2019-07-17 RX ORDER — ALBUTEROL SULFATE 0.63 MG/3ML
SOLUTION RESPIRATORY (INHALATION)
Refills: 1 | COMMUNITY
Start: 2019-04-09

## 2019-07-17 RX ORDER — FLUTICASONE PROPIONATE 100 UG/1
POWDER, METERED RESPIRATORY (INHALATION)
Refills: 0 | COMMUNITY
Start: 2019-04-10

## 2019-07-17 NOTE — PROGRESS NOTES
All health maintenance and other pertinent information has been reviewed in preparation for today's office visit. Patient presents in the office today for:    Chief Complaint   Patient presents with    Thrombocytosis     1. Have you been to the ER, urgent care clinic since your last visit? Hospitalized since your last visit? Yes, [facility] for tx of fall. 2. Have you seen or consulted any other health care providers outside of the [address] [place] [address] since your last visit? Include any pap smears or colon screening.  No

## 2019-07-17 NOTE — PROGRESS NOTES
[facility]  [address], [address] [facility], 200 [facility]  [phone]        Follow up Note        Patient: Emilia Aquino MRN: [id number]  SSN: [id number]    YOB: 1945  Age: 74 y.o. Sex: female      Subjective:      Emilia Aquino is a 74 y.o. female with thrombocytosis. She is asymptomatic. Denies bleeding or infection. Blood counts have remained stable. Review of Systems:    Constitutional: fatigue  Eyes: negative  Ears, Nose, Mouth, Throat, and Face: negative  Respiratory: negative  Cardiovascular: negative  Gastrointestinal: negative  Genitourinary:negative  Integument/Breast: negative  Hematologic/Lymphatic: negative  Musculoskeletal:back and knee pain  Neurological: negative      Past Medical History:   Diagnosis Date    Asthma     Diabetes ([facility] [address]     Hypertension     Menopause 1978     Past Surgical History:   Procedure Laterality Date    HX GYN      Partial Hysterectomy    HX HYSTERECTOMY  1978      Family History   Problem Relation Age of Onset   [de-identified] Cancer Mother     Diabetes Mother     Cancer Sister     Diabetes Sister     Psychiatric Disorder Sister     Cancer Brother     Breast Cancer Maternal Aunt      Social History     Tobacco Use    Smoking status: Former Smoker    Smokeless tobacco: Never Used   Substance Use Topics    Alcohol use: Yes     Comment: occasional      Prior to Admission medications    Medication Sig Start Date End Date Taking?  Authorizing Provider   diclofenac (VOLTAREN) 1 % gel apply 4 grams to affected area four times a day 4/30/19  Yes Provider, Historical   FLOVENT DISKUS 100 mcg/actuation dsdv  4/10/19  Yes Provider, Historical   albuterol (ACCUNEB) 0.63 mg/3 mL nebulizer solution inhale contents of 1 vial in nebulizer every 6 hours if needed 4/9/19  Yes Provider, Historical   lidocaine (LIDODERM) 5 % apply patch to affected area once daily if needed for pain -ON FOR 12 HOURS OFF FOR 12 HOURS 10/26/17  Yes Provider, Historical   PROAIR HFA 90 mcg/actuation inhaler  2/1/16  Yes Provider, Historical   hydrochlorothiazide (HYDRODIURIL) 25 mg tablet Take 25 mg by mouth daily. 2/9/16  Yes Provider, Historical   metFORMIN (GLUCOPHAGE) 500 mg tablet Take 500 mg by mouth daily (with breakfast). 12/4/15  Yes Provider, Historical   NIFEDICAL XL 60 mg ER tablet Take 60 mg by mouth daily. 2/10/16  Yes Provider, Historical   potassium chloride SA (MICRO-K) 10 mEq capsule Take 10 mEq by mouth two (2) times a day. 1/21/16  Yes Provider, Historical   terbutaline (BRETHINE) 2.5 mg tablet Take 2.5 mg by mouth three (3) times daily. 2/11/16  Yes Provider, Historical   zafirlukast (ACCOLATE) 20 mg tablet Take 20 mg by mouth two (2) times a day. 2/15/16  Yes Provider, Historical   lansoprazole (PREVACID) 30 mg capsule Take  by mouth Daily (before breakfast). Yes Provider, Historical   meclizine (ANTIVERT) 25 mg tablet take 1 tablet by mouth twice a day if needed 10/24/17   Provider, Historical   WELCHOL 3.75 gram pwpk powder packet Take 3.75 g by mouth daily. 1/10/16   Provider, Historical   fluticasone (VERAMYST) 27.5 mcg/actuation nasal spray 2 Sprays by Nasal route daily. Provider, Historical            Allergies   Allergen Reactions    Aspirin Nausea Only    Codeine Other (comments)     Jittery    Lodine [Etodolac] Other (comments)     Nausea, jittery           Objective:     Visit Vitals  /74 (BP 1 Location: Left arm, BP Patient Position: Sitting)   Pulse 71   Temp 98.1 °F (36.7 °C) (Oral)   Resp 18   Ht 5' 4\" (1.626 m)   Wt 184 lb (83.5 kg)   SpO2 95%   BMI 31.58 kg/m²       Pain Scale: 6/10  Pain Location: Back      Physical Exam:    GENERAL: alert, cooperative  EYE: negative  LYMPHATIC: Cervical, supraclavicular, and axillary nodes normal.   THROAT & NECK: normal and no erythema or exudates noted.    LUNG: clear to auscultation bilaterally  HEART: regular rate and rhythm  ABDOMEN: soft, non-tender  EXTREMITIES: no cyanosis or edema  SKIN: Normal.  NEUROLOGIC: negative        LABS: 1/10/2018    Lab Results   Component Value Date/Time    WBC 7.7 07/19/2019 01:18 PM    HGB 12.7 07/19/2019 01:18 PM    HCT 37.8 07/19/2019 01:18 PM    PLATELET 853 (H) 29/34/7975 01:18 PM    MCV 77 (L) 07/19/2019 01:18 PM         Assessment:     1. Mild thrombocytosis    ? Essential thrombocytosis  Platelets stable    JAK2, BCR-ABL1, MPL mutation - negative  Asymptomatic  Symptom management form reviewed with patient.       Plan:       > Continue observation   > CBC  > Follow-up as needed        Signed by: Gisele Rodriguez MD                     August 4, 2019

## 2019-07-19 ENCOUNTER — HOSPITAL ENCOUNTER (OUTPATIENT)
Dept: LAB | Age: 74
Discharge: HOME | End: 2019-07-19
Payer: MEDICARE

## 2019-07-19 PROCEDURE — 85025 COMPLETE CBC W/AUTO DIFF WBC: CPT

## 2019-07-19 PROCEDURE — 36415 COLL VENOUS BLD VENIPUNCTURE: CPT

## 2019-07-20 LAB
BASOPHILS # BLD AUTO: 0.1 X10E3/UL (ref 0–0.2)
BASOPHILS NFR BLD AUTO: 1 %
EOSINOPHIL # BLD AUTO: 0.2 X10E3/UL (ref 0–0.4)
EOSINOPHIL NFR BLD AUTO: 3 %
ERYTHROCYTE [DISTWIDTH] IN BLOOD BY AUTOMATED COUNT: 15.7 % (ref 12.3–15.4)
HCT VFR BLD AUTO: 37.8 % (ref 34–46.6)
HGB BLD-MCNC: 12.7 G/DL (ref 11.1–15.9)
IMM GRANULOCYTES # BLD AUTO: 0 X10E3/UL (ref 0–0.1)
IMM GRANULOCYTES NFR BLD AUTO: 0 %
LYMPHOCYTES # BLD AUTO: 2.9 X10E3/UL (ref 0.7–3.1)
LYMPHOCYTES NFR BLD AUTO: 38 %
MCH RBC QN AUTO: 25.8 PG (ref 26.6–33)
MCHC RBC AUTO-ENTMCNC: 33.6 G/DL (ref 31.5–35.7)
MCV RBC AUTO: 77 FL (ref 79–97)
MONOCYTES # BLD AUTO: 0.4 X10E3/UL (ref 0.1–0.9)
MONOCYTES NFR BLD AUTO: 5 %
NEUTROPHILS # BLD AUTO: 4.1 X10E3/UL (ref 1.4–7)
NEUTROPHILS NFR BLD AUTO: 53 %
PLATELET # BLD AUTO: 555 X10E3/UL (ref 150–450)
RBC # BLD AUTO: 4.92 X10E6/UL (ref 3.77–5.28)
WBC # BLD AUTO: 7.7 X10E3/UL (ref 3.4–10.8)

## 2019-11-11 ENCOUNTER — APPOINTMENT (OUTPATIENT)
Dept: CT IMAGING | Age: 74
End: 2019-11-11
Attending: EMERGENCY MEDICINE
Payer: MEDICARE

## 2019-11-11 ENCOUNTER — HOSPITAL ENCOUNTER (EMERGENCY)
Age: 74
Discharge: HOME | End: 2019-11-11
Attending: EMERGENCY MEDICINE
Payer: MEDICARE

## 2019-11-11 ENCOUNTER — APPOINTMENT (OUTPATIENT)
Dept: GENERAL RADIOLOGY | Age: 74
End: 2019-11-11
Attending: EMERGENCY MEDICINE
Payer: MEDICARE

## 2019-11-11 VITALS
BODY MASS INDEX: 30.73 KG/M2 | TEMPERATURE: 98.3 F | DIASTOLIC BLOOD PRESSURE: 55 MMHG | SYSTOLIC BLOOD PRESSURE: 114 MMHG | HEIGHT: 64 IN | OXYGEN SATURATION: 95 % | RESPIRATION RATE: 18 BRPM | WEIGHT: 180 LBS | HEART RATE: 88 BPM

## 2019-11-11 DIAGNOSIS — M25.561 RIGHT KNEE PAIN, UNSPECIFIED CHRONICITY: ICD-10-CM
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
## 2019-11-11 LAB
ALBUMIN SERPL-MCNC: 3.8 G/DL (ref 3.5–5)
ALBUMIN/GLOB SERPL: 1.1 {RATIO} (ref 1.1–2.2)
ALP SERPL-CCNC: 56 U/L (ref 45–117)
ALT SERPL-CCNC: 20 U/L (ref 12–78)
ANION GAP SERPL CALC-SCNC: 4 MMOL/L (ref 5–15)
AST SERPL-CCNC: 15 U/L (ref 15–37)
ATRIAL RATE: 84 BPM
BASOPHILS # BLD: 0.1 K/UL (ref 0–0.1)
BASOPHILS NFR BLD: 1 % (ref 0–1)
BILIRUB SERPL-MCNC: 0.4 MG/DL (ref 0.2–1)
BNP SERPL-MCNC: 1972 PG/ML
BUN SERPL-MCNC: 19 MG/DL (ref 6–20)
BUN/CREAT SERPL: 20 (ref 12–20)
CALCIUM SERPL-MCNC: 10.1 MG/DL (ref 8.5–10.1)
CALCULATED P AXIS, ECG09: 69 DEGREES
CALCULATED R AXIS, ECG10: -29 DEGREES
CALCULATED T AXIS, ECG11: 81 DEGREES
CHLORIDE SERPL-SCNC: 107 MMOL/L (ref 97–108)
CO2 SERPL-SCNC: 30 MMOL/L (ref 21–32)
CREAT SERPL-MCNC: 0.96 MG/DL (ref 0.55–1.02)
DIAGNOSIS, 93000: NORMAL
DIFFERENTIAL METHOD BLD: ABNORMAL
EOSINOPHIL # BLD: 0.5 K/UL (ref 0–0.4)
EOSINOPHIL NFR BLD: 6 % (ref 0–7)
ERYTHROCYTE [DISTWIDTH] IN BLOOD BY AUTOMATED COUNT: 14.6 % (ref 11.5–14.5)
GLOBULIN SER CALC-MCNC: 3.6 G/DL (ref 2–4)
GLUCOSE SERPL-MCNC: 121 MG/DL (ref 65–100)
HCT VFR BLD AUTO: 35.4 % (ref 35–47)
HGB BLD-MCNC: 11.7 G/DL (ref 11.5–16)
IMM GRANULOCYTES # BLD AUTO: 0 K/UL (ref 0–0.04)
IMM GRANULOCYTES NFR BLD AUTO: 0 % (ref 0–0.5)
LYMPHOCYTES # BLD: 2.1 K/UL (ref 0.8–3.5)
LYMPHOCYTES NFR BLD: 26 % (ref 12–49)
MCH RBC QN AUTO: 25.5 PG (ref 26–34)
MCHC RBC AUTO-ENTMCNC: 33.1 G/DL (ref 30–36.5)
MCV RBC AUTO: 77.3 FL (ref 80–99)
MONOCYTES # BLD: 0.5 K/UL (ref 0–1)
MONOCYTES NFR BLD: 6 % (ref 5–13)
NEUTS SEG # BLD: 4.9 K/UL (ref 1.8–8)
NEUTS SEG NFR BLD: 61 % (ref 32–75)
NRBC # BLD: 0 K/UL (ref 0–0.01)
NRBC BLD-RTO: 0 PER 100 WBC
P-R INTERVAL, ECG05: 164 MS
PLATELET # BLD AUTO: 558 K/UL (ref 150–400)
PMV BLD AUTO: 9.9 FL (ref 8.9–12.9)
POTASSIUM SERPL-SCNC: 3.4 MMOL/L (ref 3.5–5.1)
PROT SERPL-MCNC: 7.4 G/DL (ref 6.4–8.2)
Q-T INTERVAL, ECG07: 438 MS
QRS DURATION, ECG06: 160 MS
QTC CALCULATION (BEZET), ECG08: 517 MS
RBC # BLD AUTO: 4.58 M/UL (ref 3.8–5.2)
SODIUM SERPL-SCNC: 141 MMOL/L (ref 136–145)
TROPONIN I SERPL-MCNC: <0.05 NG/ML
VENTRICULAR RATE, ECG03: 84 BPM
WBC # BLD AUTO: 8.1 K/UL (ref 3.6–11)

## 2019-11-11 PROCEDURE — 85025 COMPLETE CBC W/AUTO DIFF WBC: CPT

## 2019-11-11 PROCEDURE — 84484 ASSAY OF TROPONIN QUANT: CPT

## 2019-11-11 PROCEDURE — 80053 COMPREHEN METABOLIC PANEL: CPT

## 2019-11-11 PROCEDURE — 83880 ASSAY OF NATRIURETIC PEPTIDE: CPT

## 2019-11-11 PROCEDURE — 70450 CT HEAD/BRAIN W/O DYE: CPT

## 2019-11-11 PROCEDURE — 99283 EMERGENCY DEPT VISIT LOW MDM: CPT

## 2019-11-11 PROCEDURE — 73562 X-RAY EXAM OF KNEE 3: CPT

## 2019-11-11 PROCEDURE — 93005 ELECTROCARDIOGRAM TRACING: CPT

## 2019-11-11 PROCEDURE — 36415 COLL VENOUS BLD VENIPUNCTURE: CPT

## 2019-11-11 NOTE — ED PROVIDER NOTES
66 y.o. female with past medical history significant for asthma, diabetes, HTN, and menopause who presents from home via personal vehicle with chief complaint of weakness. Patient states she often experiences pain to her right knee, often resulting in her leg to buckle and her falling. Patient reports her last fall was 2 days ago, resulting in no injuries except for worsening knee pain. Patient states she has occasional \"jumping\" due to nerve twitching in her legs. Patient also reports she is currently under pain management for spinal stenosis. Patient states she has had spinal injections to help her pain. Patient notes she is also experiencing worsening strength and dexterity to her hands bilaterally, and notes a history of carpal tunnel. There are no other acute medical concerns at this time. Social hx: Former smoker, EtOH  PCP: Andreas Pacheco MD    Note written by Naila Yates, as dictated by Ashleigh Vines MD 6:34 PM         The history is provided by the patient and a relative. No  was used.         Past Medical History:   Diagnosis Date    Asthma     Diabetes ([facility] [address]     Hypertension     Menopause 1978       Past Surgical History:   Procedure Laterality Date    HX GYN      Partial Hysterectomy    HX HYSTERECTOMY  1978         Family History:   Problem Relation Age of Onset    Cancer Mother     Diabetes Mother     Cancer Sister     Diabetes Sister     Psychiatric Disorder Sister     Cancer Brother     Breast Cancer Maternal Aunt        Social History     Socioeconomic History    Marital status: UNKNOWN     Spouse name: Not on file    Number of children: Not on file    Years of education: Not on file    Highest education level: Not on file   Occupational History    Not on file   Social Needs    Financial resource strain: Not on file    Food insecurity:     Worry: Not on file     Inability: Not on file    Transportation needs:     Medical: Not on file Non-medical: Not on file   Tobacco Use    Smoking status: Former Smoker    Smokeless tobacco: Never Used   Substance and Sexual Activity    Alcohol use: Yes     Comment: occasional    Drug use: No    Sexual activity: Not Currently   Lifestyle    Physical activity:     Days per week: Not on file     Minutes per session: Not on file    Stress: Not on file   Relationships    Social connections:     Talks on phone: Not on file     Gets together: Not on file     Attends [employer] service: Not on file     Active member of club or organization: Not on file     Attends meetings of clubs or organizations: Not on file     Relationship status: Not on file    Intimate partner violence:     Fear of current or ex partner: Not on file     Emotionally abused: Not on file     Physically abused: Not on file     Forced sexual activity: Not on file   Other Topics Concern    Not on file   Social History Narrative    Not on file         ALLERGIES: Aspirin; Codeine; and Lodine [etodolac]    Review of Systems   Constitutional: Negative for appetite change, chills and fever. HENT: Negative for rhinorrhea, sore throat and trouble swallowing. Eyes: Negative for photophobia. Respiratory: Negative for cough and shortness of breath. Cardiovascular: Negative for chest pain and palpitations. Gastrointestinal: Negative for abdominal pain, nausea and vomiting. Genitourinary: Negative for dysuria, frequency and hematuria. Musculoskeletal: Positive for arthralgias and back pain. Neurological: Positive for tremors and weakness. Negative for dizziness and syncope. Psychiatric/Behavioral: Negative for behavioral problems. The patient is not nervous/anxious. Vitals:    11/11/19 1551   BP: 124/74   Pulse: 88   Resp: 18   Temp: 98.3 °F (36.8 °C)   SpO2: 98%   Weight: 81.6 kg (180 lb)   Height: 5' 4\" (1.626 m)            Physical Exam   Constitutional: She is oriented to person, place, and time.  She appears well-developed and well-nourished. No distress. HENT:   Head: Normocephalic and atraumatic. Eyes: Conjunctivae and EOM are normal.   Neck: Normal range of motion. Neck supple. Cardiovascular: Normal rate, regular rhythm and normal heart sounds. Pulmonary/Chest: Effort normal and breath sounds normal. No respiratory distress. Abdominal: Soft. Bowel sounds are normal. She exhibits no distension. There is no tenderness. Musculoskeletal: Normal range of motion. Mild tenderness to right knee. Obesity muscle deconditioning. Neurological: She is alert and oriented to person, place, and time. Skin: Skin is warm and dry. Psychiatric: She has a normal mood and affect. Her behavior is normal. Judgment and thought content normal.   Nursing note and vitals reviewed.    Note written by Naila Gracia, as dictated by Yumiko Carpenter MD 6:34 PM       MDM       Procedures

## 2019-11-11 NOTE — ED TRIAGE NOTES
Pt reports she has had multiple falls in the last month. Pt has hx of spinal stenosis.  Pt reports BL knee pain, L shoulder pain, and lower back pain

## 2019-11-12 NOTE — ED NOTES
2008: Patient verbalizes understanding of discharge instructions given by provider, Dr. Rita Jones MD. Opportunity for questions provided. Patient in no apparent distress. Patient ambulatory upon discharge.    Visit Vitals  /55   Pulse 88   Temp 98.3 °F (36.8 °C)   Resp 18   Ht 5' 4\" (1.626 m)   Wt 81.6 kg (180 lb)   SpO2 95%   BMI 30.90 kg/m²

## 2019-11-12 NOTE — DISCHARGE INSTRUCTIONS
Patient Education        Preventing Falls: Care Instructions  Your Care Instructions    Getting around your home safely can be a challenge if you have injuries or health problems that make it easy for you to fall. Loose rugs and furniture in walkways are among the dangers for many older people who have problems walking or who have poor eyesight. People who have conditions such as arthritis, osteoporosis, or dementia also have to be careful not to fall. You can make your home safer with a few simple measures. Follow-up care is a key part of your treatment and safety. Be sure to make and go to all appointments, and call your doctor if you are having problems. It's also a good idea to know your test results and keep a list of the medicines you take. How can you care for yourself at home? Taking care of yourself  · You may get dizzy if you do not drink enough water. To prevent dehydration, drink plenty of fluids, enough so that your urine is light yellow or clear like water. Choose water and other caffeine-free clear liquids. If you have kidney, heart, or liver disease and have to limit fluids, talk with your doctor before you increase the amount of fluids you drink. · Exercise regularly to improve your strength, muscle tone, and balance. Walk if you can. Swimming may be a good choice if you cannot walk easily. · Have your vision and hearing checked each year or any time you notice a change. If you have trouble seeing and hearing, you might not be able to avoid objects and could lose your balance. · Know the side effects of the medicines you take. Ask your doctor or pharmacist whether the medicines you take can affect your balance. Sleeping pills or sedatives can affect your balance. · Limit the amount of alcohol you drink. Alcohol can impair your balance and other senses. · Ask your doctor whether calluses or corns on your feet need to be removed.  If you wear loose-fitting shoes because of calluses or corns, you can lose your balance and fall. · Talk to your doctor if you have numbness in your feet. Preventing falls at home  · Remove raised doorway thresholds, throw rugs, and clutter. Repair loose carpet or raised areas in the floor. · Move furniture and electrical cords to keep them out of walking paths. · Use nonskid floor wax, and wipe up spills right away, especially on ceramic tile floors. · If you use a walker or cane, put rubber tips on it. If you use crutches, clean the bottoms of them regularly with an abrasive pad, such as steel wool. · Keep your house well lit, especially [employer] [place] [employer], and outside walkways. Use night-lights in areas such as hallways and bathrooms. Add extra light switches or use remote switches (such as switches that go on or off when you clap your hands) to make it easier to turn lights on if you have to get up during the night. · Install sturdy handrails on stairways. · Move items in your cabinets so that the things you use a lot are on the lower shelves (about waist level). · Keep a cordless phone and a flashlight with new batteries by your bed. If possible, put a phone in each of the main rooms of your house, or carry a cell phone in case you fall and cannot reach a phone. Or, you can wear a device around your neck or wrist. You push a button that sends a signal for help. · Wear low-heeled shoes that fit well and give your feet good support. Use footwear with nonskid soles. Check the heels and soles of your shoes for wear. Repair or replace worn heels or soles. · Do not wear socks without shoes on wood floors. · Walk on the grass when the sidewalks are slippery. If you live in an area that gets snow and ice in the winter, sprinkle salt on slippery steps and sidewalks. Preventing falls in the bath  · Install grab bars and nonskid mats inside and outside your shower or tub and near the toilet and sinks. · Use shower chairs and bath benches.   · Use a hand-held shower head that will allow you to sit while showering. · Get into a tub or shower by putting the weaker leg in first. Get out of a tub or shower with your strong side first.  · Repair loose toilet seats and consider installing a raised toilet seat to make getting on and off the toilet easier. · Keep your bathroom door unlocked while you are in the shower. Where can you learn more? Go to http://karen-donn.info/. Enter [phone] [id number] [phone] in the search box to learn more about \"Preventing Falls: Care Instructions. \"  Current as of: November 7, 2018  Content Version: 12.2  © 8508-9276 [employer], [employer]. Care instructions adapted under license by [employer] (which disclaims liability or warranty for this information). If you have questions about a medical condition or this instruction, always ask your healthcare professional. [address] any warranty or liability for your use of this information.

## 2019-11-19 ENCOUNTER — HOSPITAL ENCOUNTER (OUTPATIENT)
Dept: ULTRASOUND IMAGING | Age: 74
Discharge: HOME | End: 2019-11-19
Attending: INTERNAL MEDICINE
Payer: MEDICARE

## 2019-11-19 DIAGNOSIS — M79.89 SWELLING OF LIMB: ICD-10-CM

## 2019-11-19 PROCEDURE — 93970 EXTREMITY STUDY: CPT

## 2020-03-11 ENCOUNTER — HOSPITAL ENCOUNTER (OUTPATIENT)
Dept: GENERAL RADIOLOGY | Age: 75
Discharge: HOME | End: 2020-03-11
Attending: INTERNAL MEDICINE
Payer: MEDICARE

## 2020-03-11 DIAGNOSIS — M25.511 PAIN IN RIGHT SHOULDER: ICD-10-CM

## 2020-03-11 PROCEDURE — 73030 X-RAY EXAM OF SHOULDER: CPT

## 2023-05-10 RX ORDER — NIFEDIPINE 60 MG/1
60 TABLET, EXTENDED RELEASE ORAL DAILY
COMMUNITY
Start: 2016-02-10

## 2023-05-10 RX ORDER — LIDOCAINE 50 MG/G
PATCH TOPICAL
COMMUNITY
Start: 2017-10-26

## 2023-05-10 RX ORDER — POTASSIUM CHLORIDE 750 MG/1
CAPSULE, EXTENDED RELEASE ORAL 2 TIMES DAILY
COMMUNITY
Start: 2016-01-21

## 2023-05-10 RX ORDER — FLUTICASONE PROPIONATE 100 UG/1
POWDER, METERED RESPIRATORY (INHALATION)
COMMUNITY
Start: 2019-04-10

## 2023-05-10 RX ORDER — ZAFIRLUKAST 20 MG/1
TABLET, FILM COATED ORAL 2 TIMES DAILY
COMMUNITY
Start: 2016-02-15

## 2023-05-10 RX ORDER — MECLIZINE HYDROCHLORIDE 25 MG/1
1 TABLET ORAL 2 TIMES DAILY PRN
COMMUNITY
Start: 2017-10-24

## 2023-05-10 RX ORDER — COLESEVELAM HYDROCHLORIDE 3.75 G/1
POWDER, FOR SUSPENSION ORAL DAILY
COMMUNITY
Start: 2016-01-10

## 2023-05-10 RX ORDER — ALBUTEROL SULFATE 90 UG/1
AEROSOL, METERED RESPIRATORY (INHALATION)
COMMUNITY
Start: 2016-02-01

## 2023-05-10 RX ORDER — TERBUTALINE SULFATE 2.5 MG/1
TABLET ORAL 3 TIMES DAILY
COMMUNITY
Start: 2016-02-11

## 2023-05-10 RX ORDER — HYDROCHLOROTHIAZIDE 25 MG/1
25 TABLET ORAL DAILY
COMMUNITY
Start: 2016-02-09

## 2023-05-10 RX ORDER — ALBUTEROL SULFATE 0.63 MG/3ML
SOLUTION RESPIRATORY (INHALATION)
COMMUNITY
Start: 2019-04-09

## 2023-05-10 RX ORDER — LANSOPRAZOLE 30 MG/1
CAPSULE, DELAYED RELEASE ORAL
COMMUNITY

## 2023-11-10 NOTE — PROGRESS NOTES
Follow up Note        Patient: Geoffrey George MRN: [id number]  SSN: [id number]    YOB: 1945  Age: 66 y.o. Sex: female      Subjective:      Geoffrey George is a 66 y.o. female with mild thrombocytosis. She also has mild anemia. She is asymptomatic. Denies bleeding or infection. She is fatigued, but otherwise has no complaints. She has recently become a caregiver. Review of Systems:    Constitutional: fatigue  Eyes: negative  Ears, Nose, Mouth, Throat, and Face: negative  Respiratory: negative  Cardiovascular: negative  Gastrointestinal: negative  Genitourinary:negative  Integument/Breast: negative  Hematologic/Lymphatic: negative  Musculoskeletal:negative  Neurological: negative      Past Medical History   Diagnosis Date    Asthma     Diabetes ([address]     Hypertension      Past Surgical History   Procedure Laterality Date    Hx gyn       Partial Hysterectomy      Family History   Problem Relation Age of Onset    Cancer Mother     Diabetes Mother     Cancer Sister     Diabetes Sister     Psychiatric Disorder Sister     Cancer Brother      Social History   Substance Use Topics    Smoking status: Former Smoker    Smokeless tobacco: Not on file    Alcohol use Yes      Comment: occasional      Prior to Admission medications    Medication Sig Start Date End Date Taking? Authorizing Provider   PROAIR HFA 90 mcg/actuation inhaler  2/1/16  Yes Historical Provider   WELCHOL 3.75 gram pwpk powder packet Take 3.75 g by mouth daily. 1/10/16  Yes Historical Provider   hydrochlorothiazide (HYDRODIURIL) 25 mg tablet Take 25 mg by mouth daily. 2/9/16  Yes Historical Provider   metFORMIN (GLUCOPHAGE) 500 mg tablet Take 500 mg by mouth daily (with breakfast). 12/4/15  Yes Historical Provider   NIFEDICAL XL 60 mg ER tablet Take 60 mg by mouth daily. 2/10/16  Yes Historical Provider   potassium chloride SA (MICRO-K) 10 mEq capsule Take 10 mEq by mouth two (2) times a day.  1/21/16  Yes Historical Provider   predniSONE (DELTASONE) 5 mg tablet  2/16/16  Yes Historical Provider   terbutaline (BRETHINE) 2.5 mg tablet Take 2.5 mg by mouth three (3) times daily. 2/11/16  Yes Historical Provider   tobramycin (TOBREX) 0.3 % ophthalmic solution Administer 1 Drop to both eyes. 2/1/16  Yes Historical Provider   zafirlukast (ACCOLATE) 20 mg tablet Take 20 mg by mouth two (2) times a day. 2/15/16  Yes Historical Provider   lansoprazole (PREVACID) 30 mg capsule Take  by mouth Daily (before breakfast). Yes Historical Provider   fluticasone (VERAMYST) 27.5 mcg/actuation nasal spray 2 Sprays by Nasal route daily. Yes Historical Provider            Allergies   Allergen Reactions    Aspirin Nausea Only    Codeine Other (comments)     Jittery    Lodine [Etodolac] Other (comments)     Nausea, jittery           Objective:     Vitals:    01/04/17 1051   BP: 138/72   Pulse: 84   Resp: 18   Temp: 98 °F (36.7 °C)   SpO2: 98%   Weight: 186 lb (84.4 kg)   Height: 5' 4\" (1.626 m)          Physical Exam:    GENERAL: alert, cooperative  EYE: negative  LYMPHATIC: Cervical, supraclavicular, and axillary nodes normal.   THROAT & NECK: normal and no erythema or exudates noted. LUNG: clear to auscultation bilaterally  HEART: regular rate and rhythm  ABDOMEN: soft, non-tender  EXTREMITIES: no cyanosis or edema  SKIN: Normal.  NEUROLOGIC: negative        LABS:     Awaiting copies of labs from Dr. Lian Chavez           Assessment:     1. Mild thrombocytosis    If the platelet continue to rise, I will obtain JESSICA 2 mutation    > Obtain lab results - done at Dr. Joaquin Medrano office      Plan:       1. Obtain lab results from Dr. Lian Chavez PCP  2. Follow up in 6 months (she will get labs prior to this visit also.  Either from Dr. Lian Chavez or we will order this)        Signed by: Iker Curry MD                     January 4, 2017 Distance Of Undermining In Cm (Required): 1.7